# Patient Record
Sex: FEMALE | Race: WHITE | NOT HISPANIC OR LATINO | URBAN - NONMETROPOLITAN AREA
[De-identification: names, ages, dates, MRNs, and addresses within clinical notes are randomized per-mention and may not be internally consistent; named-entity substitution may affect disease eponyms.]

---

## 2020-10-28 ENCOUNTER — IMPORTED ENCOUNTER (OUTPATIENT)
Dept: URBAN - NONMETROPOLITAN AREA CLINIC 1 | Facility: CLINIC | Age: 70
End: 2020-10-28

## 2020-10-28 PROBLEM — H26.491: Noted: 2020-10-28

## 2020-10-28 PROBLEM — H25.812: Noted: 2020-10-28

## 2020-10-28 PROBLEM — Z96.1: Noted: 2020-10-28

## 2020-10-28 PROCEDURE — 66821 AFTER CATARACT LASER SURGERY: CPT

## 2020-10-28 PROCEDURE — 99204 OFFICE O/P NEW MOD 45 MIN: CPT

## 2020-10-28 NOTE — PATIENT DISCUSSION
Cataract OS-Visually significant.-Cataract causing symptomatic impairment of visual function not correctable with a tolerable change in glasses or contact lenses lighting or non-operative means resulting in specific activity limitations and/or participation restrictions including but not limited to reading viewing television driving or meeting vocational or recreational needs. -Expectation is clearer vision and reduced glare disability after cataract removal.-Refer to Dr Carter Larson for cataract evaluationPseudopahkia OD with PCO-Explained PCO and RBAs of YAG Capsulotomy to pt. -Pt elects to proceed. YAG Caps OD today and YAG Caps OD in 1-2 weeks.

## 2020-12-07 ENCOUNTER — IMPORTED ENCOUNTER (OUTPATIENT)
Dept: URBAN - NONMETROPOLITAN AREA CLINIC 1 | Facility: CLINIC | Age: 70
End: 2020-12-07

## 2020-12-07 PROBLEM — Z96.1: Noted: 2020-12-07

## 2020-12-07 PROBLEM — H02.413: Noted: 2020-12-07

## 2020-12-07 PROBLEM — H25.812: Noted: 2020-12-07

## 2020-12-07 PROCEDURE — 92014 COMPRE OPH EXAM EST PT 1/>: CPT

## 2020-12-07 NOTE — PATIENT DISCUSSION
Cataract(s)-Visually significant cataract OS . -Cataract(s) causing symptomatic impairment of visual function not correctable with a tolerable change in glasses or contact lenses lighting or non-operative means resulting in specific activity limitations and/or participation restrictions including but not limited to reading viewing television driving or meeting vocational or recreational needs. -Expectation is clearer vision and functional improvement in symptoms as well as reduced glare disability after cataract removal.-Order IOLMaster and OPD today. -Recommend Stand/ Trad  based on today's OPD testing and lifestyle questionnaire.-All questions were answered regarding surgery including pre and post-op medications appointments activity restrictions and anesthetic usage.-The risks benefits and alternatives and special risk factors for the patient were discussed in detail including but not limited to: bleeding infection retinal detachment vitreous loss problems with the implant and possible need for additional surgery.-Although rare the possibility of complete vision loss was discussed.-The possible need for glasses post-operatively was discussed.-Order medical clearance exam based on history of age of patient. -Patient elects to proceed with cataract surgery OSDoctors Recommendations Discussed all lens options with patient. Recommend Stand/Trad and she agrees. Patient understands she will need glasses for distance to correct her astigmatism and for near s/p. Discussed LRI in detail with patient she qualifies for this lens and it will reduce her astigmatism OS and give her less dependence on glasses for OS only. Will still need RX for OD. Post op inflammation anticipated discuused Dextenza insertion after surgery. Ptosis-Ptosis (the upper eyelid being in a lower than normal position) of the upper eyelid was explained to the patient. -RBAs of ptosis repair discussed with patient. Treatment options include observation or surgical correction.-Patient expresses today that she feels her eyelids are drooping really low and now causing her visual issues. She states if she physically pulls them up that she can see so much more. Pt c/o having issues seeing what is around her in her peripheal vision when driving. Denies being on ASA or blood thinners.  MRD1- 1 OUBLF-14 OU TBUT-12 secs OU(-) LAG OUWill have patient come in 1 month s/p CE OS and do a ptosis eval.

## 2020-12-21 ENCOUNTER — IMPORTED ENCOUNTER (OUTPATIENT)
Dept: URBAN - NONMETROPOLITAN AREA CLINIC 1 | Facility: CLINIC | Age: 70
End: 2020-12-21

## 2020-12-21 PROBLEM — H02.413: Noted: 2020-12-21

## 2020-12-21 PROBLEM — H25.812: Noted: 2020-12-21

## 2020-12-21 PROBLEM — Z96.1: Noted: 2020-12-21

## 2021-01-05 ENCOUNTER — IMPORTED ENCOUNTER (OUTPATIENT)
Dept: URBAN - NONMETROPOLITAN AREA CLINIC 1 | Facility: CLINIC | Age: 71
End: 2021-01-05

## 2021-01-05 PROBLEM — M81.0: Noted: 2021-01-05

## 2021-01-05 PROBLEM — J47.9: Noted: 2021-01-05

## 2021-01-05 PROBLEM — Z01.818: Noted: 2021-01-05

## 2021-01-22 ENCOUNTER — IMPORTED ENCOUNTER (OUTPATIENT)
Dept: URBAN - NONMETROPOLITAN AREA CLINIC 1 | Facility: CLINIC | Age: 71
End: 2021-01-22

## 2021-01-22 PROCEDURE — 92136 OPHTHALMIC BIOMETRY: CPT

## 2021-01-22 PROCEDURE — 99024 POSTOP FOLLOW-UP VISIT: CPT

## 2021-01-22 PROCEDURE — 66999 UNLISTED PX ANT SEGMENT EYE: CPT

## 2021-01-22 PROCEDURE — 66984 XCAPSL CTRC RMVL W/O ECP: CPT

## 2021-01-22 NOTE — PATIENT DISCUSSION
Same day s/p PCIOL OS (1/22/21) Stand/Trad with LRI-Pt doing well s/p PCIOL. -Continue post-op gtts according to instruction sheet and sleep with eye shield over eye for 7 nights.-Avoid bending at the waist lifting anything over 5lbs and dirty or kit environments. Pseudophakia OD with PCO OD-Doing well following previous CE with IOL implantation.-Patient has PCO OD noted today on clinical exam.-Patient is not symptomatic despite PCO. -Recommend observation.

## 2021-03-28 PROBLEM — H26.491: Noted: 2021-03-28

## 2021-03-28 PROBLEM — Z98.42: Noted: 2021-02-23

## 2021-03-28 PROBLEM — Z96.1: Noted: 2021-03-28

## 2022-04-09 ASSESSMENT — VISUAL ACUITY
OD_PH: 20/30
OS_SC: 20/40
OD_PH: 20/30
OD_GLARE: 20/60
OS_CC: 20/70
OS_PH: 20/400
OS_PH: 20/30
OS_GLARE: 20/200
OD_PH: 20/30
OD_SC: 20/40-
OS_PH: 20/30
OS_PH: 20/25-1
OS_AM: 20/25
OS_GLARE: 20/200
OS_GLARE: 20/200
OD_CC: 20/60+1
OS_AM: 20/25

## 2022-04-09 ASSESSMENT — TONOMETRY
OD_IOP_MMHG: 15
OS_IOP_MMHG: 15
OD_IOP_MMHG: 12
OS_IOP_MMHG: 12
OD_IOP_MMHG: 15
OS_IOP_MMHG: 16